# Patient Record
Sex: FEMALE | Race: BLACK OR AFRICAN AMERICAN
[De-identification: names, ages, dates, MRNs, and addresses within clinical notes are randomized per-mention and may not be internally consistent; named-entity substitution may affect disease eponyms.]

---

## 2018-02-06 ENCOUNTER — HOSPITAL ENCOUNTER (OUTPATIENT)
Dept: HOSPITAL 92 - BICMRI | Age: 78
Discharge: HOME | End: 2018-02-06
Attending: INTERNAL MEDICINE
Payer: MEDICARE

## 2018-02-06 DIAGNOSIS — R93.3: ICD-10-CM

## 2018-02-06 DIAGNOSIS — K76.89: Primary | ICD-10-CM

## 2018-02-06 DIAGNOSIS — N28.1: ICD-10-CM

## 2018-02-06 PROCEDURE — 71045 X-RAY EXAM CHEST 1 VIEW: CPT

## 2018-02-06 PROCEDURE — A9579 GAD-BASE MR CONTRAST NOS,1ML: HCPCS

## 2018-02-06 PROCEDURE — 74183 MRI ABD W/O CNTR FLWD CNTR: CPT

## 2018-03-29 ENCOUNTER — HOSPITAL ENCOUNTER (OUTPATIENT)
Dept: HOSPITAL 92 - ULT | Age: 78
Discharge: HOME | End: 2018-03-29
Attending: INTERNAL MEDICINE
Payer: MEDICARE

## 2018-03-29 VITALS — DIASTOLIC BLOOD PRESSURE: 62 MMHG | TEMPERATURE: 97.9 F | SYSTOLIC BLOOD PRESSURE: 134 MMHG

## 2018-03-29 VITALS — BODY MASS INDEX: 26.5 KG/M2

## 2018-03-29 DIAGNOSIS — R94.5: ICD-10-CM

## 2018-03-29 DIAGNOSIS — K76.89: Primary | ICD-10-CM

## 2018-03-29 DIAGNOSIS — Z79.899: ICD-10-CM

## 2018-03-29 LAB
APTT PPP: 38.4 SEC (ref 22.9–36.1)
BASOPHILS # BLD AUTO: 0.1 THOU/UL (ref 0–0.2)
BASOPHILS NFR BLD AUTO: 1.1 % (ref 0–1)
EOSINOPHIL # BLD AUTO: 0.2 THOU/UL (ref 0–0.7)
EOSINOPHIL NFR BLD AUTO: 3.8 % (ref 0–10)
HGB BLD-MCNC: 12.2 G/DL (ref 12–16)
INR PPP: 1.2
LYMPHOCYTES # BLD: 2.1 THOU/UL (ref 1.2–3.4)
LYMPHOCYTES NFR BLD AUTO: 33.5 % (ref 21–51)
MCH RBC QN AUTO: 29.2 PG (ref 27–31)
MCV RBC AUTO: 92.1 FL (ref 81–99)
MONOCYTES # BLD AUTO: 0.7 THOU/UL (ref 0.11–0.59)
MONOCYTES NFR BLD AUTO: 10.5 % (ref 0–10)
NEUTROPHILS # BLD AUTO: 3.1 THOU/UL (ref 1.4–6.5)
NEUTROPHILS NFR BLD AUTO: 51.1 % (ref 42–75)
PLATELET # BLD AUTO: 340 THOU/UL (ref 130–400)
PROTHROMBIN TIME: 15.1 SEC (ref 12–14.7)
RBC # BLD AUTO: 4.18 MILL/UL (ref 4.2–5.4)
REF LAB TEST NAME: (no result)
REF LAB TEST NAME: (no result)
WBC # BLD AUTO: 6.2 THOU/UL (ref 4.8–10.8)

## 2018-03-29 PROCEDURE — 0FB13ZX EXCISION OF RIGHT LOBE LIVER, PERCUTANEOUS APPROACH, DIAGNOSTIC: ICD-10-PCS | Performed by: RADIOLOGY

## 2018-03-29 PROCEDURE — 87070 CULTURE OTHR SPECIMN AEROBIC: CPT

## 2018-03-29 PROCEDURE — 36415 COLL VENOUS BLD VENIPUNCTURE: CPT

## 2018-03-29 PROCEDURE — 85610 PROTHROMBIN TIME: CPT

## 2018-03-29 PROCEDURE — 88173 CYTOPATH EVAL FNA REPORT: CPT

## 2018-03-29 PROCEDURE — 77012 CT SCAN FOR NEEDLE BIOPSY: CPT

## 2018-03-29 PROCEDURE — 47000 NEEDLE BIOPSY OF LIVER PERQ: CPT

## 2018-03-29 PROCEDURE — 88304 TISSUE EXAM BY PATHOLOGIST: CPT

## 2018-03-29 PROCEDURE — 85025 COMPLETE CBC W/AUTO DIFF WBC: CPT

## 2018-03-29 PROCEDURE — 87205 SMEAR GRAM STAIN: CPT

## 2018-03-29 PROCEDURE — 85730 THROMBOPLASTIN TIME PARTIAL: CPT

## 2018-03-29 PROCEDURE — 88305 TISSUE EXAM BY PATHOLOGIST: CPT

## 2018-03-29 NOTE — CT
CT GUIDED RIGHT HEPATIC LOBE CYST BIOPSY AND ASPIRATION:

 

TECHNIQUE:

Informed consent was obtained.  A time out was performed.  The patient was placed in the left lateral
 decubitus position on the CT gantry table.  A pre-procedure CT was obtained for guidance purposes on
ly.  The site overlying the lateral aspect of the complex cystic mass within the right hepatic dome w
as marked.  This site was prepped and draped in the usual sterile fashion.  

 

The patient underwent conscious sedation under the guidance of the radiology nurse.  The patient rece
ived 25 mcg of IV Fentanyl and 0.5 mg of IV Versed.  

 

The biopsy site was anesthetized utilizing buffered 1% Lidocaine.  Under CT fluoroscopic guidance, a 
19 gauge trocar needle was guided down into the complex cystic mass.  Attempts were made to core biop
sy the walls of the mass lesion, as well as the complex internal septations seen on MR evaluation at 
the [facility], dated 02/06/2018.  The core samples proved to be unfruitful.  No s
ignificant soft tissue was obtained for the core samples.  Attempt was then made to provide a fine ne
edle aspirate of the material.  There were 11 mL of slightly greenish fluid removed from the cyst.  T
here was some scattered soft tissue debris within the aspirate.  An additional attempt was made at th
e aspirate to remove some more solid tissue.  A cluster of the retained solid debris was submitted in
 formalin.  Two separate aspirates, for a total of 11 mL, of fluid was submitted for cytology, as wel
l as fluid analysis of .  The patient tolerated the procedure without difficulty.  Post procedu
re images demonstrated a small amount of gas seen within the complex cystic lesion.

 

FINDINGS:

Complex cystic lesion involving the right hepatic dome, as seen on the MRI examination of the abdomen
, dated 02/06/2018, from the [facility].  There are additional prominen
t simple cysts involving the liver.  There are prominent simple cysts involving the kidneys.  There a
re nonobstructing calculi bilaterally.  There are scattered vascular calcifications.

 

IMPRESSION:

Successful CT guided fine needle aspirate of the complex cystic mass within the right hepatic dome.

 

POS: [facility]

## 2018-04-03 NOTE — CT
CT GUIDED RIGHT HEPATIC LOBE CYST BIOPSY AND ASPIRATION:

 

TECHNIQUE:

Informed consent was obtained.  A time out was performed.  The patient was placed in the left lateral
 decubitus position on the CT gantry table.  A pre-procedure CT was obtained for guidance purposes on
ly.  The site overlying the lateral aspect of the complex cystic mass within the right hepatic dome w
as marked.  This site was prepped and draped in the usual sterile fashion.  

 

The patient underwent conscious sedation under the guidance of the radiology nurse.  The patient rece
ived 25 mcg of IV Fentanyl and 0.5 mg of IV Versed.  

 

The biopsy site was anesthetized utilizing buffered 1% Lidocaine.  Under CT fluoroscopic guidance, a 
19 gauge trocar needle was guided down into the complex cystic mass.  Attempts were made to core biop
sy the walls of the mass lesion, as well as the complex internal septations seen on MR evaluation at 
the [facility], dated 02/06/2018.  The core samples proved to be unfruitful.  No s
ignificant soft tissue was obtained for the core samples.  Attempt was then made to provide a fine ne
edle aspirate of the material.  There were 11 mL of slightly greenish fluid removed from the cyst.  T
here was some scattered soft tissue debris within the aspirate.  An additional attempt was made at th
e aspirate to remove some more solid tissue.  A cluster of the retained solid debris was submitted in
 formalin.  Two separate aspirates, for a total of 11 mL, of fluid was submitted for cytology, as wel
l as fluid analysis of .  The patient tolerated the procedure without difficulty.  Post procedu
re images demonstrated a small amount of gas seen within the complex cystic lesion.

 

FINDINGS:

Complex cystic lesion involving the right hepatic dome, as seen on the MRI examination of the abdomen
, dated 02/06/2018, from the [facility].  There are additional prominen
t simple cysts involving the liver.  There are prominent simple cysts involving the kidneys.  There a
re nonobstructing calculi bilaterally.  There are scattered vascular calcifications.

 

IMPRESSION:

Successful CT guided fine needle aspirate of the complex cystic mass within the right hepatic dome.

## 2018-09-12 ENCOUNTER — HOSPITAL ENCOUNTER (EMERGENCY)
Dept: HOSPITAL 9 - MADERS | Age: 78
Discharge: HOME | End: 2018-09-12
Payer: MEDICARE
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
## 2018-09-12 DIAGNOSIS — I10: ICD-10-CM

## 2018-09-12 DIAGNOSIS — Z79.82: ICD-10-CM

## 2018-09-12 DIAGNOSIS — S61.012A: Primary | ICD-10-CM

## 2018-09-12 DIAGNOSIS — E78.5: ICD-10-CM

## 2018-09-12 DIAGNOSIS — Z79.899: ICD-10-CM

## 2018-09-12 PROCEDURE — 12001 RPR S/N/AX/GEN/TRNK 2.5CM/<: CPT

## 2019-02-11 ENCOUNTER — HOSPITAL ENCOUNTER (OUTPATIENT)
Dept: HOSPITAL 92 - BICMRI | Age: 79
Discharge: HOME | End: 2019-02-11
Attending: INTERNAL MEDICINE
Payer: MEDICARE

## 2019-02-11 DIAGNOSIS — N28.1: ICD-10-CM

## 2019-02-11 DIAGNOSIS — K76.89: ICD-10-CM

## 2019-02-11 DIAGNOSIS — R93.3: Primary | ICD-10-CM

## 2019-02-11 LAB — ESTIMATED GFR-MDRD - POC: 44

## 2019-02-11 PROCEDURE — A9577 INJ MULTIHANCE: HCPCS

## 2019-02-11 PROCEDURE — 82565 ASSAY OF CREATININE: CPT

## 2019-02-11 PROCEDURE — 74183 MRI ABD W/O CNTR FLWD CNTR: CPT

## 2019-02-11 NOTE — MRI
MRI ABDOMEN WITH AND WITHOUT CONTRAST:

 

Date:  02/11/19 

 

HISTORY:  

R93.3 abnormal findings on GI tract. 

 

COMPARISON:  

MRI abdomen from 08/02/18 and 02/06/18, as well as a percutaneous biopsy of 03/29/18. 

 

FINDINGS:

There is no significant interval size increase of the complicated cysts of the right lobe of the live
r, involving hepatic segments VII and VIII, measures up to 9.1 cm in transverse dimension x 7.3 cm in
 AP dimension x 6.7 cm craniocaudad dimension. No internal enhancement of the septations. 

 

There is a large cyst in hepatic segment Daysi and IVb, which is simple, measuring up to 8.7 cm, causin
g mass effect upon the common hepatic duct with mild intrahepatic biliary dilatation in both the righ
t and left biliary system. There are multiple smaller hepatic cysts scattered throughout the liver wi
thout enhancement. 

 

The aortic contour is nonaneurysmal. Moderate atherosclerotic plaque. 

 

There are multiple renal cysts. There is a small perisplenic lymph node. 

 

In the uncinate process, there are two separate subcentimeter T2 hyperintense foci which are unchange
d from the comparison examination. 

 

Background marrow signal is similar. 

 

IMPRESSION: 

1.  Similar appearance to the hepatic cysts with the previously aspirated hepatic segment VI and VII 
cysts unchanged in size with internal septations and debris may be a ruptured echinococcal cyst. 

 

2.  Large cyst hepatic Daysi and IVb causing mass effect upon the common hepatic duct with mild intrahe
patic left and right-sided biliary dilatation. No extrahepatic biliary dilatation. 

 

3.  Renal cysts. 

 

4.  Subcentimeter T2 hyperintense foci at the pancreatic head and uncinate process are unchanged. Fol
low-up pancreatic protocol MRI in 1 year recommended. See our white paper. 

 

 

POS: [facility]

## 2020-01-28 ENCOUNTER — HOSPITAL ENCOUNTER (OUTPATIENT)
Dept: HOSPITAL 92 - ERS | Age: 80
Setting detail: OBSERVATION
LOS: 1 days | Discharge: HOME | End: 2020-01-29
Attending: FAMILY MEDICINE | Admitting: FAMILY MEDICINE
Payer: MEDICARE

## 2020-01-28 ENCOUNTER — HOSPITAL ENCOUNTER (EMERGENCY)
Dept: HOSPITAL 9 - MADERS | Age: 80
Discharge: ANOTHER HOSPITAL | End: 2020-01-28
Payer: MEDICARE

## 2020-01-28 VITALS — BODY MASS INDEX: 28.8 KG/M2

## 2020-01-28 DIAGNOSIS — D63.1: ICD-10-CM

## 2020-01-28 DIAGNOSIS — Q61.02: ICD-10-CM

## 2020-01-28 DIAGNOSIS — Z79.899: ICD-10-CM

## 2020-01-28 DIAGNOSIS — R55: Primary | ICD-10-CM
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
## 2020-01-28 DIAGNOSIS — R55: ICD-10-CM

## 2020-01-28 DIAGNOSIS — Z79.82: ICD-10-CM

## 2020-01-28 DIAGNOSIS — K76.89: ICD-10-CM

## 2020-01-28 DIAGNOSIS — Z95.818: ICD-10-CM
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
## 2020-01-28 DIAGNOSIS — R79.89: ICD-10-CM

## 2020-01-28 DIAGNOSIS — E78.5: ICD-10-CM

## 2020-01-28 DIAGNOSIS — I12.9: ICD-10-CM

## 2020-01-28 DIAGNOSIS — I25.10: ICD-10-CM

## 2020-01-28 DIAGNOSIS — N17.9: ICD-10-CM

## 2020-01-28 DIAGNOSIS — I10: ICD-10-CM

## 2020-01-28 DIAGNOSIS — S01.81XA: Primary | ICD-10-CM

## 2020-01-28 DIAGNOSIS — N18.3: ICD-10-CM

## 2020-01-28 LAB
ALBUMIN SERPL BCG-MCNC: 4.2 G/DL (ref 3.4–4.8)
ALP SERPL-CCNC: 90 U/L (ref 40–110)
ALT SERPL W P-5'-P-CCNC: 14 U/L (ref 8–55)
ANION GAP SERPL CALC-SCNC: 16 MMOL/L (ref 10–20)
AST SERPL-CCNC: 17 U/L (ref 5–34)
BASOPHILS # BLD AUTO: 0.1 THOU/UL (ref 0–0.2)
BASOPHILS NFR BLD AUTO: 1.4 % (ref 0–1)
BILIRUB SERPL-MCNC: 0.4 MG/DL (ref 0.2–1.2)
BUN SERPL-MCNC: 23 MG/DL (ref 9.8–20.1)
CALCIUM SERPL-MCNC: 9.4 MG/DL (ref 7.8–10.44)
CHLORIDE SERPL-SCNC: 105 MMOL/L (ref 98–107)
CK MB SERPL-MCNC: 1.4 NG/ML (ref 0–6.6)
CO2 SERPL-SCNC: 24 MMOL/L (ref 23–31)
CREAT CL PREDICTED SERPL C-G-VRATE: 0 ML/MIN (ref 70–130)
EOSINOPHIL # BLD AUTO: 0.2 THOU/UL (ref 0–0.7)
EOSINOPHIL NFR BLD AUTO: 2.1 % (ref 0–10)
GLOBULIN SER CALC-MCNC: 3.7 G/DL (ref 2.4–3.5)
GLUCOSE SERPL-MCNC: 161 MG/DL (ref 83–110)
HGB BLD-MCNC: 10.7 G/DL (ref 12–16)
LYMPHOCYTES # BLD AUTO: 1.4 THOU/UL (ref 1.2–3.4)
LYMPHOCYTES NFR BLD AUTO: 17.2 % (ref 21–51)
MAGNESIUM SERPL-MCNC: 2.1 MG/DL (ref 1.6–2.6)
MANUAL DIFF??: NO
MCH RBC QN AUTO: 26.1 PG (ref 27–31)
MCV RBC AUTO: 87 FL (ref 78–98)
MDIFF COMPLETE?: YES
MONOCYTES # BLD AUTO: 0.6 THOU/UL (ref 0.11–0.59)
MONOCYTES NFR BLD AUTO: 6.7 % (ref 0–10)
NEUTROPHILS # BLD AUTO: 5.9 THOU/UL (ref 1.4–6.5)
NEUTROPHILS NFR BLD AUTO: 72.6 % (ref 42–75)
PLATELET # BLD AUTO: 383 THOU/UL (ref 130–400)
POTASSIUM SERPL-SCNC: 4.4 MMOL/L (ref 3.5–5.1)
RBC # BLD AUTO: 4.12 MILL/UL (ref 4.2–5.4)
SODIUM SERPL-SCNC: 141 MMOL/L (ref 136–145)
TROPONIN I SERPL DL<=0.01 NG/ML-MCNC: 0.05 NG/ML (ref ?–0.03)
TROPONIN I SERPL DL<=0.01 NG/ML-MCNC: 0.06 NG/ML (ref ?–0.03)
WBC # BLD AUTO: 8.1 THOU/UL (ref 4.8–10.8)

## 2020-01-28 PROCEDURE — 82553 CREATINE MB FRACTION: CPT

## 2020-01-28 PROCEDURE — G0378 HOSPITAL OBSERVATION PER HR: HCPCS

## 2020-01-28 PROCEDURE — 85379 FIBRIN DEGRADATION QUANT: CPT

## 2020-01-28 PROCEDURE — 96360 HYDRATION IV INFUSION INIT: CPT

## 2020-01-28 PROCEDURE — 80053 COMPREHEN METABOLIC PANEL: CPT

## 2020-01-28 PROCEDURE — 83550 IRON BINDING TEST: CPT

## 2020-01-28 PROCEDURE — 93005 ELECTROCARDIOGRAM TRACING: CPT

## 2020-01-28 PROCEDURE — 83540 ASSAY OF IRON: CPT

## 2020-01-28 PROCEDURE — 12011 RPR F/E/E/N/L/M 2.5 CM/<: CPT

## 2020-01-28 PROCEDURE — 85025 COMPLETE CBC W/AUTO DIFF WBC: CPT

## 2020-01-28 PROCEDURE — 82607 VITAMIN B-12: CPT

## 2020-01-28 PROCEDURE — 93880 EXTRACRANIAL BILAT STUDY: CPT

## 2020-01-28 PROCEDURE — 82746 ASSAY OF FOLIC ACID SERUM: CPT

## 2020-01-28 PROCEDURE — 70450 CT HEAD/BRAIN W/O DYE: CPT

## 2020-01-28 PROCEDURE — 83735 ASSAY OF MAGNESIUM: CPT

## 2020-01-28 PROCEDURE — 71275 CT ANGIOGRAPHY CHEST: CPT

## 2020-01-28 PROCEDURE — 84484 ASSAY OF TROPONIN QUANT: CPT

## 2020-01-28 PROCEDURE — 96361 HYDRATE IV INFUSION ADD-ON: CPT

## 2020-01-28 PROCEDURE — 84466 ASSAY OF TRANSFERRIN: CPT

## 2020-01-28 PROCEDURE — 84443 ASSAY THYROID STIM HORMONE: CPT

## 2020-01-28 PROCEDURE — 71046 X-RAY EXAM CHEST 2 VIEWS: CPT

## 2020-01-28 PROCEDURE — 36415 COLL VENOUS BLD VENIPUNCTURE: CPT

## 2020-01-28 PROCEDURE — 80048 BASIC METABOLIC PNL TOTAL CA: CPT

## 2020-01-28 NOTE — CT
CT HEAD WITHOUT CONTRAST:

 

HISTORY: 

Fall with head injury.

 

FINDINGS: 

Ventricles have normal size and position.  There is moderate cortical atrophy.  No evidence of mass o
r hemorrhage.  There is no evidence of cortical infarct.  No significant white matter abnormality.  P
aranasal sinuses are clear.

 

IMPRESSION: 

No acute finding.

 

POS: [facility]

## 2020-01-29 VITALS — TEMPERATURE: 98.1 F

## 2020-01-29 VITALS — SYSTOLIC BLOOD PRESSURE: 150 MMHG | DIASTOLIC BLOOD PRESSURE: 67 MMHG

## 2020-01-29 LAB
ANION GAP SERPL CALC-SCNC: 15 MMOL/L (ref 10–20)
BASOPHILS # BLD AUTO: 0 THOU/UL (ref 0–0.2)
BASOPHILS NFR BLD AUTO: 0.5 % (ref 0–1)
BUN SERPL-MCNC: 25 MG/DL (ref 9.8–20.1)
CALCIUM SERPL-MCNC: 9.3 MG/DL (ref 7.8–10.44)
CHLORIDE SERPL-SCNC: 105 MMOL/L (ref 98–107)
CK MB SERPL-MCNC: 1.5 NG/ML (ref 0–6.6)
CO2 SERPL-SCNC: 24 MMOL/L (ref 23–31)
CREAT CL PREDICTED SERPL C-G-VRATE: 36 ML/MIN (ref 70–130)
EOSINOPHIL # BLD AUTO: 0.2 THOU/UL (ref 0–0.7)
EOSINOPHIL NFR BLD AUTO: 2.7 % (ref 0–10)
GLUCOSE SERPL-MCNC: 112 MG/DL (ref 83–110)
HGB BLD-MCNC: 10.9 G/DL (ref 12–16)
IRON SERPL-MCNC: 49 UG/DL (ref 50–170)
LYMPHOCYTES # BLD: 2.3 THOU/UL (ref 1.2–3.4)
LYMPHOCYTES NFR BLD AUTO: 27.5 % (ref 21–51)
MCH RBC QN AUTO: 26.5 PG (ref 27–31)
MCV RBC AUTO: 85.3 FL (ref 78–98)
MONOCYTES # BLD AUTO: 0.7 THOU/UL (ref 0.11–0.59)
MONOCYTES NFR BLD AUTO: 8.7 % (ref 0–10)
NEUTROPHILS # BLD AUTO: 5.1 THOU/UL (ref 1.4–6.5)
NEUTROPHILS NFR BLD AUTO: 60.5 % (ref 42–75)
PLATELET # BLD AUTO: 318 THOU/UL (ref 130–400)
POTASSIUM SERPL-SCNC: 3.9 MMOL/L (ref 3.5–5.1)
RBC # BLD AUTO: 4.12 MILL/UL (ref 4.2–5.4)
SODIUM SERPL-SCNC: 140 MMOL/L (ref 136–145)
TRANSFERRIN SERPL-MCNC: 188 MG/DL (ref 173–360)
UIBC SERPL-MCNC: 235 MCG/DL (ref 265–497)
WBC # BLD AUTO: 8.4 THOU/UL (ref 4.8–10.8)

## 2020-01-29 NOTE — CT
PRELIMINARY REPORT/[facility]/EMERGENCY AFTER HOURS PROCEDURE: 

 

EXAM: 

CTA Chest with Intravenous Contrast 

 

CLINICAL HISTORY: 

F80, ELEVATED D-DIMER, PATIENT STATES SHE HAD A SYNCOPAL EPISODE YESTERDAY. DENIES CHEST PAIN OR SOB.
 EVAL FOR PE. 

 

TECHNIQUE: 

Axial CTA images of the chest with intravenous contrast. MIP reconstructed images were created and re
viewed. 

 

CONTRAST: 

With; ISOVUE 370, 60ML 

 

COMPARISON: 

None provided. 

 

FINDINGS: 

 

PULMONARY ARTERIES 

There is no intraluminal filling defect suspicious for large or segmental PE.  Phase of contrast limi
ts assessment at the subsegmental level. 

 

AORTA 

No thoracic aortic aneurysm or dissection.  Atherosclerotic calcification. 

 

LUNGS 

The lungs are clear. No pulmonary mass. No focal airspace consolidation. 

 

PLEURAL SPACES 

No pleural effusion. No pneumothorax. 

 

HEART AND MEDIASTINUM 

Four-chamber cardiac enlargement.  No significant pericardial effusion.  Coronary atherosclerosis. 

 

LYMPH NODES 

Calcified mediastinal lymph nodes compatible with sequelae of prior granulomatous or fungal disease. 


 

BONES 

No focal osseous abnormality or acute fracture.  Multilevel degenerative changes of the spine. 

 

CHEST WALL AND UPPER ABDOMEN 

Multiple hepatic cysts, measuring up to 9.9 cm in the left hepatic lobe and up to 7.9 cm in the right
 hepatic lobe.  Multiple bilateral renal cysts, measuring up to 3.5 cm in the left kidney.  Implanted
 electronic device in the anterior left chest wall. 

 

IMPRESSION: 

 

1.  No evidence of PE within the limitations of this exam. 

2.  Cardiomegaly with coronary artery disease. 

3.  No acute pulmonary abnormality. 

4.  Large hepatic cysts.  Correlate for symptoms. 

5.  Multiple renal cysts.

 

ELECTRONICALLY SIGNED BY:

Ash Chow M.D.

Jan 29, 2020 2:31:06 AM CST

 

This report is intended for review by the ordering physician only, in accordance of law. If you recei
ve this report in error, please call [facility] at [phone].

 

 

 

 

 

FINAL REPORT 

 

EMERGENCY AFTER HOURS CTA CHEST WITH CONTRAST:

 

FINDINGS/IMPRESSION: 

I agree with the findings and impression given in the preliminary report per [facility] physici
an. 

1.  No evidence of pulmonary thromboembolism. 

2.  Hepatic and renal cysts.

## 2020-01-29 NOTE — ULT
EXAM: Carotid ultrasound



HISTORY: Syncope



COMPARISON: None



TECHNIQUE: Multiplanar grayscale and color Doppler images were obtained in a carotid ultrasound. Spec
tral analysis of the Doppler waveforms were performed.



FINDINGS:

A small amount of calcified plaque is seen in the proximal left internal carotid artery. No significa
nt plaque is seen in either common carotid artery.



The Doppler waveforms are normal in the visualized vessels.



Peak systolic velocity in the right internal carotid artery 78 cm/s.

Peak systolic velocity in the right common carotid artery 79 cm/s.

The right ICA/CCA ratio is 1.0.



Peak systolic velocity in the left internal carotid artery 106 cm/s.

Peak systolic velocity in the left common carotid artery 91 cm/s.

The left ICA/CCA ratio is 1.2.



Both vertebral arteries demonstrate antegrade flow without focal stenosis



IMPRESSION:

No evidence of hemodynamically significant stenosis.



Reported By: Wilmar Villalta 

Electronically Signed:  1/29/2020 9:21 AM

## 2020-01-29 NOTE — HP
TIME OF ASSESSMENT:  2200



CHIEF COMPLAINT:  Syncope.



HISTORY OF PRESENT ILLNESS:  Ms. Coppola is a very pleasant 79-year-old woman who

presents to the emergency department following a syncopal episode.  The patient

states she had been ironing several jeans and became tired, therefore, sat down on

the side of her bed and states she suddenly woke up on the floor.  She states she is

confident she fainted for "not even a minute."  She states she banged her forehead

against the bed frame.  She was initially seen at [place] [facility] where the head

laceration was sutured.  She had a CT of the head, which was unremarkable.  The

patient does not recall experiencing any preceding symptoms such as dizziness,

lightheadedness, chest pain, or shortness of breath.  She states she has felt well

in herself in recent days.  Has not had any recent fevers, chills, or night sweats.

Her appetite has been very good.  The patient states after getting up from the

floor, she did become nauseated and vomited once.  Has not had any issues with

headaches, nausea, or vomiting since then.  The patient states she feels back to her

normal self. 



Reports having issues with syncope in the past, up to 4 episodes and states she

follows regularly with Dr. Lopez who she saw 2 weeks ago.  She reports the cardiac

loop recorder was interrogated and there were no abnormal events per Dr. Lopez. 



In the ED, she had an EKG done which showed normal sinus rhythm with a heart rate of

66.  She had laboratory studies done, which were notable for an elevated troponin of

0.062, second troponin was 0.057, and third troponin 0.053.  Labs are otherwise

notable for a normal white count of 8.1, hemoglobin was 10.7 which has reduced from

baseline.  Hematocrit 35.8, platelets 283.  Sodium 141, potassium 4.4.  Renal

function slightly reduced from normal.  BUN 23, creatinine 1.69, and GFR 35.  GFR is

usually in the mid 40s.  LFTs unremarkable.  Albumin 4.2. 



The patient was given 324 mg of aspirin due to the indeterminate troponin.  She is

being admitted for further monitoring and workup. 



The patient denies any complaints at this present time.  States her appetite has

been very good and she has not noted any urinary changes or bowel changes.  Denies

any melena or bright red blood per rectum.  No diarrhea or constipation.  All other

review of systems are negative.  The patient states she functions very independently

and has fully mobile home without any assistive devices. 



PAST MEDICAL HISTORY:  

1. Hyperlipidemia.

2. Hypertension.

3. "Leaky valve.".

4. History of syncope.



PAST SURGICAL HISTORY:  

1. Cardiac loop recorder.

2. Hysterectomy.



SOCIAL HISTORY:  The patient lives alone and is fully independent.  Denies any

tobacco use, alcohol consumption or illicit drug use. 



ALLERGIES:  NO KNOWN DRUG ALLERGIES.



CURRENT MEDICATIONS:  

1. Amlodipine.

2. Aspirin.

3. Atorvastatin.

4. Benazepril/HCTZ.

5. Cholecalciferol.

6. Iron.



PHYSICAL EXAMINATION:

GENERAL:  The patient appears thin, well developed, and in no acute distress. 

VITAL SIGNS:  Temperature 98.2, pulse 71, respirations 16, O2 saturation 99% on room

air, blood pressure 125/68. 

HEENT:  Normocephalic.  The patient with a laceration along the mid frontal

hairline.  No active bleeding.  No swelling.  No bruising.  Sutures in place.  No

swelling or erythema.  Extraocular movements intact. 

NECK:  Supple.  Full range of motion.  No cervical spine tenderness.  Oropharynx is

clear. 

LUNGS:  Clear to auscultation bilaterally without any wheezes, rales, or rhonchi. 

CARDIAC:  Regular rate and rhythm. 

ABDOMEN:  Soft, nontender, nondistended.  No guarding or rigidity.  No renal angle

tenderness. 

EXTREMITIES:  No lower leg swelling or edema. 

NEUROLOGIC:  Alert and oriented x3.  Speech normal.  Facial movements normal.

Facial sensation intact.  Power 5/5 in all limbs.  No neuro deficits on exam. 

SKIN:  Warm and dry.



INVESTIGATIONS:  As mentioned above in the HPI.



IMPRESSION AND PLAN:  Ms. Coppola is a very pleasant 79-year-old woman who is being

admitted for management of the following. 

1. Syncope.  The patient sustained head injury and CT imaging done in the ED was

unremarkable.  The patient without any neuro deficits prior or following her

syncopal scissor following the syncopal episode.  She reports having issues with

syncope in the past, but has been quite sometime.  She has a loop recorder in place

and recently seen by Dr. Lopez 2 weeks ago and interrogation was unremarkable.

Loop recorder will need to be interrogated again.  We will continue cardiac

monitoring.  We will obtain 4th troponin.  No prior troponins to compare to.  We

will add a BNP.  We will obtain echo as patient feels it has been almost a year

since the last one.  We will add on TSH. 

2. Acute on chronic renal insufficiency.  Renal function has slightly further

reduced from her baseline.  We will give gentle hydration.  This could be an

underlying cause for her syncopal episode.  We will continue to monitor her renal

function.  We will obtain orthostatic blood pressures. 

3. Anemia.  The patient with hemoglobin of 10.7.  She is on iron supplements at

home.  It is lower than her baseline.  We will check stool for occult blood.  We

will also obtain iron studies. 

4. Hypertension.  Monitor blood pressure.  We will resume amlodipine only for now.

5. Hyperlipidemia.  We will resume home medications once verified.

6. Code status.  The patient is full code status.  Surrogate decision maker is her

, Fan Coppola. 



Case to be discussed with attending for further recommendations.







Job ID:  [id number]

## 2020-01-29 NOTE — RAD
EXAM:

Chest 2 views:



HISTORY:

Chest pain



COMPARISON:

None.



FINDINGS:

There is a normal-sized cardiomediastinal silhouette.  A cardiac monitoring device projects of the le
ft chest wall.  There is no evidence of consolidation, mass, or pleural effusion.   The bones are

unremarkable. 





IMPRESSION:

No evidence of acute cardiopulmonary disease



Reported By: Wilmar Villalta 

Electronically Signed:  1/29/2020 8:34 AM

## 2020-01-29 NOTE — DIS
DATE OF ADMISSION:  01/28/2020



DATE OF DISCHARGE:  01/29/2020



PRIMARY CARE PROVIDER:  Dr. Lester.



DISPOSITION:  Discharged home.



FINAL DIAGNOSES:  

1. Syncope.

2. Hypertension.

3. Dyslipidemia.

4. Chronic kidney disease stage 3.



DISCHARGE MEDICATIONS:  Same as home medications;

1. Amlodipine 10 mg a day.

2. Atorvastatin 40 mg a day.

3. Aspirin 81 mg a day.

4. Iron 27 mg a day.

5. Benazepril/hydrochlorothiazide 20/25 once a day.



ALLERGIES:  NO KNOWN DRUG ALLERGIES.



CODE STATUS:  Full.



PENDING AT TIME OF DISCHARGE:  Nothing.



DIET:  Heart healthy.



HOSPITAL COURSE:  The patient was admitted to [facility] after

episode of syncope.  She had a history of syncope, had a loop recorder.  Her

laboratory was really unremarkable except for troponin 0.057, 0.053, 0.054, thought

to be secondary to chronic kidney disease stage 3.  Creatinine 1.42, BUN 25.  Lytes

balanced.  TSH 1.7, folate 17.  CBC; hemoglobin 10.9, white count 8.4, platelet

count 318,000.  Chest x-ray revealed no acute findings.  CT of the chest revealed no

evidence for PE.  Carotid Dopplers are clean with no stenosis.  Currently, the

patient's vital signs are stable.  Cardiorespiratory exam is normal.  I have

discussed the situation with Dr. Lopez.  Her loop recorder has been interrogated

and records no bradycardia, etc.  He is agreeable with her being discharged home.

He will follow up with her when he is in the [facility] in 3 weeks.  She

has been told to see her PCP in 3 days for followup and to have her sutures out

seven days after they were put in.  She has sutures in her scalp that was sutured in

[place]. 







Job ID:  [id number]

## 2024-01-02 ENCOUNTER — HOSPITAL ENCOUNTER (OUTPATIENT)
Dept: HOSPITAL 92 - CSHMRI | Age: 84
Discharge: HOME | End: 2024-01-02
Attending: PHYSICIAN ASSISTANT
Payer: MEDICARE

## 2024-01-02 DIAGNOSIS — D64.9: ICD-10-CM

## 2024-01-02 DIAGNOSIS — K83.8: ICD-10-CM

## 2024-01-02 DIAGNOSIS — K76.89: ICD-10-CM

## 2024-01-02 DIAGNOSIS — R19.5: Primary | ICD-10-CM

## 2024-01-02 PROCEDURE — 74183 MRI ABD W/O CNTR FLWD CNTR: CPT

## 2024-02-28 ENCOUNTER — HOSPITAL ENCOUNTER (OUTPATIENT)
Dept: HOSPITAL 92 - SPEC | Age: 84
End: 2024-02-28
Attending: INTERNAL MEDICINE
Payer: MEDICARE

## 2024-02-28 DIAGNOSIS — Z79.899: ICD-10-CM

## 2024-02-28 DIAGNOSIS — D64.9: ICD-10-CM

## 2024-02-28 DIAGNOSIS — R93.3: Primary | ICD-10-CM

## 2024-02-28 DIAGNOSIS — K76.89: ICD-10-CM

## 2024-02-28 DIAGNOSIS — D12.3: ICD-10-CM

## 2024-02-28 DIAGNOSIS — E78.5: ICD-10-CM

## 2024-02-28 DIAGNOSIS — I10: ICD-10-CM

## 2024-02-28 LAB
APTT PPP: 35.9 SEC (ref 22.9–36.1)
BASOPHILS # BLD AUTO: 0.1 THOU/UL (ref 0–0.2)
BASOPHILS NFR BLD AUTO: 0.6 % (ref 0–1)
EOSINOPHIL # BLD AUTO: 0 THOU/UL (ref 0–0.7)
EOSINOPHIL NFR BLD AUTO: 0.4 % (ref 0–10)
HCT VFR BLD CALC: 29.6 % (ref 36–47)
HGB BLD-MCNC: 9.5 G/DL (ref 12–16)
INR PPP: 1.2
LYMPHOCYTES NFR BLD AUTO: 13.9 % (ref 21–51)
MCH RBC QN AUTO: 26.7 PG (ref 27–31)
MCV RBC AUTO: 83.1 FL (ref 78–98)
MONOCYTES # BLD AUTO: 1.1 THOU/UL (ref 0.11–0.59)
MONOCYTES NFR BLD AUTO: 10.6 % (ref 0–10)
NEUTROPHILS # BLD AUTO: 7.5 THOU/UL (ref 1.4–6.5)
NEUTROPHILS NFR BLD AUTO: 74.2 % (ref 42–75)
PLATELET # BLD AUTO: 406 10X3/UL (ref 130–400)
PROTHROMBIN TIME: 15.4 SEC (ref 12–14.7)
RBC # BLD AUTO: 3.56 MILL/UL (ref 4.2–5.4)
WBC # BLD AUTO: 10.2 10X3/UL (ref 4.8–10.8)

## 2024-02-28 PROCEDURE — 82378 CARCINOEMBRYONIC ANTIGEN: CPT

## 2024-02-28 PROCEDURE — 85730 THROMBOPLASTIN TIME PARTIAL: CPT

## 2024-02-28 PROCEDURE — 87205 SMEAR GRAM STAIN: CPT

## 2024-02-28 PROCEDURE — 0F923ZZ DRAINAGE OF LEFT LOBE LIVER, PERCUTANEOUS APPROACH: ICD-10-PCS | Performed by: RADIOLOGY

## 2024-02-28 PROCEDURE — 87075 CULTR BACTERIA EXCEPT BLOOD: CPT

## 2024-02-28 PROCEDURE — 85025 COMPLETE CBC W/AUTO DIFF WBC: CPT

## 2024-02-28 PROCEDURE — 47010 HEPATOT OPN DRG ABSC/CST 1/2: CPT

## 2024-02-28 PROCEDURE — 87070 CULTURE OTHR SPECIMN AEROBIC: CPT

## 2024-02-28 PROCEDURE — 76942 ECHO GUIDE FOR BIOPSY: CPT

## 2024-02-28 PROCEDURE — 85610 PROTHROMBIN TIME: CPT
